# Patient Record
(demographics unavailable — no encounter records)

---

## 2024-11-04 NOTE — ASSESSMENT
[FreeTextEntry1] : The patient comes in today for evaluation of her right knee.  The last cortisone shot she had was about 4 months ago.  So far the shot continues to be helpful.  She has minimal pain with walking and stairs and has no pain at night.  In general she feels comfortable and does not need any treatment today.  Examination of the right lower extremity reveals normal neurovascular exam.  Examination of the right knee reveals slight limitation of range of motion from 5 to 115 degrees.  There is no effusion.  There is no joint line tenderness.  There is no Sebastian's sign of instability.  The plan at this time is activities as tolerated.  Will see her back in the office as needed.  We will resume cortisone shots if necessary.

## 2024-11-04 NOTE — HISTORY OF PRESENT ILLNESS
[Intermittent] : intermittent [Meds] : meds [Standing] : standing [] : yes [FreeTextEntry5] : 83 y/o F presents for f/u eval of the Rt. knee today. States she is here for questions of her right knee. Previous CSI with good relief.  [FreeTextEntry7] : Rt. thigh

## 2025-07-10 NOTE — ASSESSMENT
[FreeTextEntry1] : The patient comes in today for evaluation of her right hip.  She had open reduction internal fixation of right intertrochanteric hip fracture at [facility] on March 19, 2022 by Dr. Depend guidance.  Overall she has been doing relatively well but lately has had some discomfort in the right leg.  She occasionally has pain walking distances and doing stairs.  She denies groin pain.  Examination of right lower extremity vascular exam and equal leg lengths.  She has full range of motion of the right hip with mild groin pain.  There is no weakness of hip flexion or abduction.  There is no point tenderness.  Her scar is well-healed.  There is no redness or rashes.  X-rays done in the office today the AP pelvis 1 view of the right hip 2 views shows the inotrope fracture to be healed in anatomic position with appropriate position of hardware.  There is no secondary arthritis.  The left hip has no arthritis.  There are no obvious tumors, mass or calcifications seen.  The plan at this time is activities as tolerated and we will see her back in the office as needed.

## 2025-07-10 NOTE — ASSESSMENT
[FreeTextEntry1] : The patient comes in today for evaluation of her right hip.  She had open reduction internal fixation of right intertrochanteric hip fracture at [facility] on March 19, 2022 by Dr. Depend guidance.  Overall she has been doing relatively well but lately has had some discomfort in the right leg.  She occasionally has pain walking distances and doing stairs.  She denies groin pain.  Examination of right lower extremity vascular exam and equal leg lengths.  She has full range of motion of the right hip with mild groin pain.  There is no weakness of hip flexion or abduction.  There is no point tenderness.  Her scar is well-healed.  There is no redness or rashes.  X-rays done in the office today the AP pelvis 1 view of the right hip 2 views shows the inotrope fracture to be healed in anatomic position with appropriate position of hardware.  There is no secondary arthritis.  The left hip has no arthritis.  There are no obvious tumors, mass or calcifications seen.  The plan at this time is activities as tolerated and we will see her back in the office as needed.

## 2025-07-10 NOTE — HISTORY OF PRESENT ILLNESS
[FreeTextEntry5] : 85 y/o F presents for a NI. states to have fallen in march 2022 and broken her hip had sx with  . states that last month pain started laterally. has some pain when walking